# Patient Record
Sex: FEMALE | Race: WHITE
[De-identification: names, ages, dates, MRNs, and addresses within clinical notes are randomized per-mention and may not be internally consistent; named-entity substitution may affect disease eponyms.]

---
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
## 2022-07-27 ENCOUNTER — HOSPITAL ENCOUNTER (EMERGENCY)
Dept: HOSPITAL 75 - ER | Age: 22
Discharge: HOME | End: 2022-07-27
Payer: COMMERCIAL

## 2022-07-27 VITALS — DIASTOLIC BLOOD PRESSURE: 89 MMHG | SYSTOLIC BLOOD PRESSURE: 144 MMHG

## 2022-07-27 VITALS — WEIGHT: 275.58 LBS | BODY MASS INDEX: 45.91 KG/M2 | HEIGHT: 64.96 IN

## 2022-07-27 DIAGNOSIS — R51.9: ICD-10-CM

## 2022-07-27 DIAGNOSIS — E66.9: ICD-10-CM

## 2022-07-27 DIAGNOSIS — H53.8: Primary | ICD-10-CM

## 2022-07-27 DIAGNOSIS — F17.290: ICD-10-CM

## 2022-07-27 PROCEDURE — 70450 CT HEAD/BRAIN W/O DYE: CPT

## 2022-07-27 NOTE — ED HEADACHE
General


Chief Complaint:  Head/Cervical Problems


Stated Complaint:  BLURRED VISION


Nursing Triage Note:  


ARRIVED VIA AMBULATORY TO [facility] WITH COMPLAINTS OF MIGRAINE AND BLURRED VISION 


STARTING THIS AM.  PT STATES HER VISION IS NORMAL AT THIS TIME AND HAS NOT TAKEN




ANY MEDS FOR HER MIGRAINE.


Source:  patient


Exam Limitations:  no limitations





History of Present Illness


Date Seen by Provider:  Jul 27, 2022


Time Seen by Provider:  14:33


Initial Comments


Patient is a 22-year-old female who presents ED with frontal head pain and 

blurry vision bilateral.  Symptoms started around 9:00 today when she woke up.  

Started having this frontal head pain described as pressure with bilateral 

blurry vision.  She states it feels like her vision goes in and out and becomes 

dark bialteral.  Denies of any floaters or complete loss of her vision.  She 

denies of any weakness on one side, facial droop, slurred speech, vomiting or 

the worst headache of her life.  Denies of any recent travels or surgeries.  She

states she has been having intermittent symptoms over the past week.  Patient 

saw ophthalmology 2 weeks ago with a negative work-up.  She denies of any fever,

ear ringing, hearing loss, n,v,v, sore throat, neck pain, chest pain, shortness 

of breath.  Denies of any recent travels.  She does wear glasses.





Allergies and Home Medications


Allergies


Coded Allergies:  


     Penicillins (Verified  Allergy, Unknown, 12/20/16)





Patient Home Medication List


Home Medication List Reviewed:  Yes


Azithromycin (Zithromax) 200 Mg/5 Ml Susp.recon, 1 TSP PO DAILY


   Prescribed by: PERLA ROQUE on 12/23/16 1039


Dexamethasone (Dexamethasone) 0.5 Mg/5 Ml Solution, 2 TSP PO DAILY


   Prescribed by: PERLA ROQUE on 12/23/16 1039


Hydrocodone Bit/Homatrop Me-Br (Hydrocodone Compound Syrup) 120 Ml Syrup, 2-2.5 

TSP PO Q4H PRN for PAIN


   Prescribed by: PERLA ROQUE on 12/23/16 1039


Hydroxyzine Pamoate (Hydroxyzine Pamoate) 50 Mg Capsule, 50 MG PO Q6H PRN for 

ANXIETY


   Prescribed by: ROSALINE GLASS on 4/23/20 1456


Nitrofurantoin Monohyd/M-Cryst (Macrobid 100 mg Capsule) 100 Mg Capsule, 1 TAB 

PO BID


   Prescribed by: ROSALINE GLASS on 4/23/20 0096


Tetracaine (Tetracaine Suckers) Sucker Ea, 3 EA MT UD PRN for PAIN


   Prescribed by: PERLA ROQUE on 12/23/16 1039





Review of Systems


Review of Systems


Constitutional:  No chills, No diaphoresis, No malaise, No weakness


Eyes:  Blurred Vision; Denies Inflammation, Denies Photophobia, Denies Shadows


Ears, Nose, Mouth, Throat:  denies ear pain, denies ear discharge


Respiratory:  No cough, No dyspnea on exertion


Cardiovascular:  No chest pain


Gastrointestinal:  No abdominal pain, No diarrhea, No nausea, No vomiting


Genitourinary:  No decreased output, No discharge


Musculoskeletal:  No back pain, No joint pain


Skin:  No change in color, No change in hair/nails





All Other Systems Reviewed


Negative Unless Noted:  Yes





Past Medical-Social-Family Hx


Patient Social History


Tobacco Use?:  No


Use of E-Cig and/or Vaping dev:  Yes


Substance use?:  Yes


Substance type:  Marijuana


Alcohol Use?:  No





Immunizations Up To Date


COVID19 Vaccine :  MODERNA





Seasonal Allergies


Seasonal Allergies:  No





Past Medical History


Surgeries:  Yes (WISDOM TEETH)


Adenoidectomy, Tonsillectomy


Respiratory:  No


Cardiac:  No


Neurological:  Yes ("TICS-TOURETTE'S" DX 04/2020; PSUEDOSEIZURES BEGINNING 

04/22/20)


Reproductive Disorders:  Yes


Female Reproductive Disorders:  Menstrual Problems, Polycystic Ovarian Dis


GYN History:  IUD


Sexually Transmitted Disease:  No


HIV/AIDS:  No


Genitourinary:  No


Gastrointestinal:  No


Musculoskeletal:  No


Endocrine:  Yes (OBESITY)


HEENT:  Yes


Tonsilitis


Loss of Vision:  Bilateral


Hearing Impairment:  Denies


Cancer:  No


Psychosocial:  Yes


Anxiety, Depression


Integumentary:  No


Blood Disorders:  No


Adverse Reaction/Blood Tranf:  No (N/A)





Physical Exam


Vital Signs





Vital Signs - First Documented








 7/27/22





 14:15


 


Temp 36.3


 


Pulse 90


 


Resp 16


 


B/P (MAP) 144/89 (107)


 


Pulse Ox 100


 


O2 Delivery Room Air





Capillary Refill : Less Than 3 Seconds


Height, Weight, BMI


Height: 5'5.00"


Weight: 220lbs. 0.0oz. 99.136589pv; 45.00 BMI


Method:


General Appearance:  WD/WN, no apparent distress


HEENT:  PERRL/EOMI, normal ENT inspection, TMs normal, pharynx normal


Neck:  non-tender, full range of motion, supple, normal inspection


Cardiovascular:  regular rate, rhythm, no edema, no gallop, no JVD


Respiratory:  chest non-tender, lungs clear, normal breath sounds, no 

respiratory distress, no accessory muscle use


Gastrointestinal:  normal bowel sounds, non tender, soft, no organomegaly


Back:  normal inspection, no CVA tenderness


Extremities:  normal range of motion, non-tender, normal inspection, no pedal 

edema


Crainal Nerves:  normal hearing, normal speech, PERRL


Coordination/Gait:  normal finger to nose


Motor/Sensory:  no motor deficit, no sensory deficit, no pronator drift


Skin:  normal color, warm/dry





Progress/Results/Core Measures


Results/Orders


My Orders





Orders - MARIA E CASTRO


Ct Head Wo (7/27/22 14:32)


Ibuprofen  Tablet (Motrin  Tablet) (7/27/22 14:45)





Medications Given in ED





Current Medications








 Medications  Dose


 Ordered  Sig/Brett


 Route  Start Time


 Stop Time Status Last Admin


Dose Admin


 


 Ibuprofen  800 mg  ONCE  ONCE


 PO  7/27/22 14:45


 7/27/22 14:46 DC 7/27/22 14:39


800 MG








Vital Signs/I&O











 7/27/22





 14:15


 


Temp 36.3


 


Pulse 90


 


Resp 16


 


B/P (MAP) 144/89 (107)


 


Pulse Ox 100


 


O2 Delivery Room Air














Blood Pressure Mean:                    107











Departure


Communication (PCP)


Patient states the blurry vision has improved but does have frontal headache.  

Denies taking anything for pain.  Was given ibuprofen with improvement.  Neuro 

exam unremarkable.  No neurological red flag findings.  NIH 0.  CT scan negative

for mass, hemorrhaging of the head.  Patient has had a negative work-up with 

ophthalmology 2 weeks ago.  Due to her current symptoms over the past month 

recommend outpatient follow-up for further evaluation.  May warrant a outpatient

MRI for any other acute abnormality such as a smaller lesion, MS. patient agrees

with plan of action.  Patient with a stable gait here.





Impression





   Primary Impression:  


   Blurry vision


   Additional Impression:  


   Headache


Disposition:  01 HOME, SELF-CARE


Condition:  Stable





Departure-Patient Inst.


Decision time for Depature:  15:08


Referrals:  


[facility] (PCP)


Primary Care Physician








PERLA ROQUE (Family)


Primary Care Physician


Patient Instructions:  Headache, Adult (DC)





Add. Discharge Instructions:  





Recommend follow-up your PCP for further evaluation.  Continue with Tylenol 

ibuprofen for headache


All discharge instructions reviewed with patient and/or family. Voiced 

understanding.











MARIA E CASTRO          Jul 27, 2022 14:35

## 2022-07-27 NOTE — DIAGNOSTIC IMAGING REPORT
INDICATION: Blurry vision.



TECHNIQUE: Routine non contrast-enhanced axial images were

obtained from the skull base to the vertex. Auto Exposure

Controls were utilized during the CT exam to meet ALARA standards

for radiation dose reduction



COMPARISON: 04/23/2020.



FINDINGS: The ventricles and cortical sulci are normal in size

and contour. There is no midline shift or mass-effect. No acute

intra-axial hemorrhage is seen. There are no abnormal areas of

increased or decreased density to suggest acute hemorrhage or

edema. No extra-axial masses or collections are present. The bony

calvarium is intact. The visualized paranasal sinuses are

unremarkable. The mastoid air cells are clear.



IMPRESSION:  

1. No acute intracranial abnormality. No CT evidence of mass,

acute infarct or intracranial hemorrhage.



Dictated by: 



  Dictated on workstation # [id number]

## 2025-03-09 NOTE — XMS REPORT
Hepatology: Initial Office Note     Patient: Navya Clements, a 71 y.o. year old female presents for evaluation of elevated liver enzymes.   PCP: Iveth Johnson, DO    History of Present Illness   Navya Clements presents for evaluation of elevated liver enzymes.     Noted to have a positive HCV Ab but no HCV RNA on PCR. In addition, evidence of positive HBV core Ab and sAb on labs.   Was seen by Dr Maloney for the hepatitis serologies in July 2024. Unclear why she did not return to his clinic for evaluation of LFTs.     Denies symptoms of right upper quadrant abdominal pain, nausea, vomiting, jaundice, abdominal distention, confusion.     Review of Systems   Constitutional/ General: No fever, no night sweats, no weight loss  Eyes: no yellow discoloration  ENT: normal   Cardiovascular: no chest pain, no palpitations  Respiratory: no shortness of breath  Gastrointestinal: denies abdominal pain, nausea, vomiting  Integumentary: no rashes, no yellow discoloration of skin  Neurologic: no weakness or numbness of extremities  Psychiatric: no mood fluctuations  Musculoskeletal: no joint swelling   Genitourinary: no dysuria, no hematuria    All other systems have been reviewed and are negative except as noted in the HPI and above.    PMHx: SLE, lymphocytic colitis, BRCA negative breast cancer, DM II, HLD, anxiety/depression.   PSHx: mastectomy.   Social hx: alcohol use- 2 drinks a week, occasionally, + hx of smoking, denies hx of illicit substance use. Hx of tattoo+.   Family hx: brothers with hx of AUD related liver ds.     Medications     Current Outpatient Medications   Medication Instructions    buPROPion SR (WELLBUTRIN SR) 150 mg, oral, 2 times daily, Do not crush, chew, or split.    citalopram (CELEXA) 20 mg, oral, Daily    fluticasone (Flonase) 50 mcg/actuation nasal spray 1 spray, Each Nostril, Daily, Shake gently. Before first use, prime pump. After use, clean tip and replace cap.    rosuvastatin (CRESTOR) 40  [facility]

                             Created on: 2019



Stella Albert

External Reference #: [id number]

: 2000

Sex: Female



Demographics





                          Address                   [address] [place] [address]

 

                          Preferred Language        Unknown

 

                          Marital Status            Unknown

 

                          Religion Affiliation     Unknown

 

                          Race                      Unknown

 

                          Ethnic Group              Unknown





Author





                          Author                    Stella Hager Doctor

 

                          Organization              [facility]

 

                          Address                   Unknown

 

                          Phone                     Unavailable







Care Team Providers





                    Care Team Member Name Role                Phone

 

                    Migration,  Doctor  Unavailable         Unavailable







PROBLEMS

Unknown Problems



ALLERGIES





             Substance    Reaction     Event Type   Date         Status

 

             Penicillin   Unknown      Drug Allergy  Active







ENCOUNTERS





                Encounter       Location        Date            Diagnosis

 

                          [facility] [address] [id number]

[id number] [place] [address] 

[id number]                 17 Oct, 2018              Sore throat J02.9

 

                          [facility]     [address] [id number]

[address] [place] [id number]

                                         

 

                          [facility]     [address] [id number]

[address] [place] [id number]

                          28 Aug, 2014               

 

                          [facility]     [address] [id number]

[address] [place] [id number]

                          28 Aug, 2014               







IMMUNIZATIONS

No Known Immunizations



SOCIAL HISTORY

Never Assessed



REASON FOR VISIT

[facility]



PLAN OF CARE





VITAL SIGNS





MEDICATIONS

No Known Medications



RESULTS

No Results



PROCEDURES

No Known procedures



INSTRUCTIONS





MEDICATIONS ADMINISTERED

No Known Medications



MEDICAL (GENERAL) HISTORY





                    Type                Description         Date

 

                    Surgical History    Tonsilectomy age 16 mg, oral, Daily    traZODone (DESYREL) 100 mg, oral, Nightly         Physical Examination   There were no vitals filed for this visit.  There were no vitals filed for this visit.  There is no height or weight on file to calculate BMI.    Constitutional: awake, alert   Eyes: EOMI, anicteric sclera  ENT: no oropharyngeal lesions visualized  Respiratory: Bilateral air entry equal no wheezing  Cardiovascular: regular rate and rhythm, no lower extremity edema  Abdomen: soft, non tender, non distended, no free fluid wave appreciated, bowel sounds present  Integumentary: no wounds on examined skin   Musculoskeletal: no joint swelling   Neurologic: gross motor strength intact, no asterixis  Psychiatric: alert, appropriate mood and affect, oriented to time/place/person    Labs     HCV RNA Result   Date Value Ref Range Status   02/16/2024 Not Detected Not detected Final      Lab Results   Component Value Date    ALT 55 (H) 02/25/2025    ALT 26 11/19/2024    ALT 18 07/12/2024    AST 47 (H) 02/25/2025    AST 15 11/19/2024    AST 15 07/12/2024    ALKPHOS 82 02/25/2025    ALKPHOS 82 11/19/2024    ALKPHOS 64 07/12/2024    BILITOT 1.1 02/25/2025    BILITOT 1.1 11/19/2024    BILITOT 0.8 07/12/2024     Lab Results   Component Value Date    HEPBSAG Nonreactive 02/16/2024    HEPBSAB 189.4 (H) 01/31/2024    HEPBCAB Reactive (A) 02/16/2024    HEPCAB Reactive (A) 02/16/2024    HEPATOT Nonreactive 01/31/2024    TTGA <1.0 01/22/2024     Labs 8/7/2023 hepatitis C antibody positive, hepatitis C RNA negative.     Imaging   Colonoscopy Feb 2024: Impression  Single small angioectasia in the cecum; induced coagulation and hemostasis achieved with argon plasma coagulation  2 medium, flat (grade 2) hemorrhoids; bleeding was observed  FINAL DIAGNOSIS      A.  ASCENDING COLON, RANDOM BIOPSIES:              - LYMPHOCYTIC (MICROSCOPIC) COLITIS; NEGATIVE FOR DYSPLASIA/MALIGNANCY.              - GRANULOMAS NOT PRESENT IN SUBMITTED MATERIAL.     B.   DESCENDING COLON, RANDOM BIOPSIES:              - LYMPHOCYTIC (MICROSCOPIC) COLITIS; NEGATIVE FOR DYSPLASIA/MALIGNANCY.              - GRANULOMAS NOT PRESENT IN SUBMITTED MATERIAL.       Assessment and Plan    Navya Clements, a 71 y.o. year old female presents for evaluation of elevation in liver enzymes. I have reviewed pertinent provider notes, labs and imaging studies. Discussed results and their interpretation with the patient today.    No diagnosis found.  No orders of the defined types were placed in this encounter.    # Hepatocellular pattern of liver enzyme elevations on labs in Feb   [] med exposure  []         Repeat LFTs  [] US imaging